# Patient Record
Sex: FEMALE | Race: WHITE | NOT HISPANIC OR LATINO | URBAN - METROPOLITAN AREA
[De-identification: names, ages, dates, MRNs, and addresses within clinical notes are randomized per-mention and may not be internally consistent; named-entity substitution may affect disease eponyms.]

---

## 2017-09-02 ENCOUNTER — OUTPATIENT (OUTPATIENT)
Dept: OUTPATIENT SERVICES | Facility: HOSPITAL | Age: 47
LOS: 1 days | Discharge: HOME | End: 2017-09-02

## 2017-09-02 DIAGNOSIS — Z12.31 ENCOUNTER FOR SCREENING MAMMOGRAM FOR MALIGNANT NEOPLASM OF BREAST: ICD-10-CM

## 2017-10-06 ENCOUNTER — OUTPATIENT (OUTPATIENT)
Dept: OUTPATIENT SERVICES | Facility: HOSPITAL | Age: 47
LOS: 1 days | Discharge: HOME | End: 2017-10-06

## 2017-10-06 DIAGNOSIS — R92.8 OTHER ABNORMAL AND INCONCLUSIVE FINDINGS ON DIAGNOSTIC IMAGING OF BREAST: ICD-10-CM

## 2019-01-10 ENCOUNTER — OUTPATIENT (OUTPATIENT)
Dept: OUTPATIENT SERVICES | Facility: HOSPITAL | Age: 49
LOS: 1 days | Discharge: HOME | End: 2019-01-10

## 2019-01-10 DIAGNOSIS — Z12.31 ENCOUNTER FOR SCREENING MAMMOGRAM FOR MALIGNANT NEOPLASM OF BREAST: ICD-10-CM

## 2019-02-27 ENCOUNTER — EMERGENCY (EMERGENCY)
Facility: HOSPITAL | Age: 49
LOS: 0 days | Discharge: HOME | End: 2019-02-27
Attending: EMERGENCY MEDICINE | Admitting: EMERGENCY MEDICINE

## 2019-02-27 VITALS
SYSTOLIC BLOOD PRESSURE: 142 MMHG | HEART RATE: 72 BPM | TEMPERATURE: 98 F | OXYGEN SATURATION: 99 % | DIASTOLIC BLOOD PRESSURE: 68 MMHG | RESPIRATION RATE: 18 BRPM

## 2019-02-27 VITALS
HEART RATE: 89 BPM | DIASTOLIC BLOOD PRESSURE: 76 MMHG | RESPIRATION RATE: 18 BRPM | OXYGEN SATURATION: 97 % | TEMPERATURE: 98 F | SYSTOLIC BLOOD PRESSURE: 150 MMHG

## 2019-02-27 DIAGNOSIS — R04.0 EPISTAXIS: ICD-10-CM

## 2019-02-27 DIAGNOSIS — L03.213 PERIORBITAL CELLULITIS: ICD-10-CM

## 2019-02-27 DIAGNOSIS — H57.10 OCULAR PAIN, UNSPECIFIED EYE: ICD-10-CM

## 2019-02-27 DIAGNOSIS — E11.9 TYPE 2 DIABETES MELLITUS WITHOUT COMPLICATIONS: ICD-10-CM

## 2019-02-27 RX ORDER — AZTREONAM 2 G
1 VIAL (EA) INJECTION
Qty: 20 | Refills: 0 | OUTPATIENT
Start: 2019-02-27 | End: 2019-03-08

## 2019-02-27 RX ADMIN — Medication 1 TABLET(S): at 22:26

## 2019-02-27 NOTE — ED ADULT NURSE NOTE - OBJECTIVE STATEMENT
pt is A&Ox3, ambulatory, able to make needs known and presents with C/O left eye,shoulder and chest pain since 3pm. Pt also reports HTN with intermittent nose bleed over the past week.

## 2019-02-27 NOTE — ED PROVIDER NOTE - NSFOLLOWUPCLINICS_GEN_ALL_ED_FT
[facility]  Ophthalmolgy  [address], [address]  [place] [address]  Phone: [phone]  Fax:     [facility]  ENT  [address], [address]  [place] [address]  Phone: [phone]  Fax:   Follow Up Time:

## 2019-02-27 NOTE — ED PROVIDER NOTE - PROGRESS NOTE DETAILS
patient has nml vision. no pain on EOM. give abx for preseptal. encourage return for worsening symptoms.

## 2019-02-27 NOTE — ED PROVIDER NOTE - CARE PLAN
Principal Discharge DX:	Preseptal cellulitis of left eye Principal Discharge DX:	Preseptal cellulitis of left eye  Secondary Diagnosis:	Epistaxis

## 2019-02-27 NOTE — ED PROVIDER NOTE - ATTENDING CONTRIBUTION TO CARE
49 yo F, history of well controlled DM II, here for redness to lateral L nose and periorbital area, mild swelling. Sx noted this afternoon. No fever, chills, change in vision, no HA or pain with EOM, no recent exposure or trauma.    VS normal, on exam has mild swelling to periorbital area, mild tenderness over same area and lateral nose. Full, painless EOM, no pain with palpation over globe, no tenderness to temple. Mild nasal turbinate edema L>R.    Sx likely preseptal cellulitis. No signs of orbital cellulitis, globe injury, glaucoma,    Will start abx. Discussesd with patient and son the possibility of failure of outpatient meds and need for close follow up and return precautions. Also explicitly discussed dangers of extension of infection to septal space, sinuses and meningitis and its consequences.

## 2019-02-27 NOTE — ED PROVIDER NOTE - NSFOLLOWUPINSTRUCTIONS_ED_ALL_ED_FT
Periorbital Cellulitis in Adults    WHAT YOU NEED TO KNOW:    Periorbital cellulitis is an infection of the skin and tissues in the front of your eye. The infection can quickly cause vision problems. It can spread to your brain and cause meningitis. Periorbital cellulitis must be treated immediately to prevent serious complications.Cellulitis of the Eye         DISCHARGE INSTRUCTIONS:    Return to the emergency department if:     You lose vision in your infected eye.    You have vision problems, such as double vision.    You have difficulty moving your eyeball.    You cannot close your eye due to swelling.    You have a fever.    You develop a headache and are vomiting.    You have a stiff neck.    Contact your healthcare provider if:   Your symptoms do not get better within 24 hours of treatment.  Your symptoms return.      You have questions or concerns about your condition or care.    Medicines:     Antibiotics are used to treat a bacterial infection.    Take your medicine as directed. Contact your healthcare provider if you think your medicine is not helping or if you have side effects. Tell him or her if you are allergic to any medicine. Keep a list of the medicines, vitamins, and herbs you take. Include the amounts, and when and why you take them. Bring the list or the pill bottles to follow-up visits. Carry your medicine list with you in case of an emergency.    Follow up with your healthcare provider as directed: You may need to be referred to other healthcare providers. You may also need more treatment. Write down your questions so you remember to ask them during your visits.    Keep your eyes clean: Clean your eyes with warm water daily and as needed. Wash your hands with soap and water before and after you clean your eyes.    Protect your eyes: Do not scratch or rub your eyes.    Epistaxis (nosebleed)    Nosebleeds are common and can be caused by many conditions, such as injury, infections, dry mucous membranes or dry climate, medicines, nose picking, and home heating and cooling systems. Try controlling your nosebleed by pinching your nose continuously for at least 10 minutes. Avoid lying down while you are having a nosebleed. Sit up and lean forward. Avoid blowing or sniffing your nose for a number of hours after having a nosebleed. Resume your normal activities as you are able, but avoid straining, lifting, or bending at the waist for several days. Maintain humidity in your home by using less air conditioning or by using a humidifier.     If your nose was packed by your health care provider, try to maintain the pack inside of your nose until your health care provider removes it. If a balloon catheter was used to pack your nose, do not cut or remove it unless your health care provider has instructed you to do that.     Aspirin and blood thinners make bleeding more likely. If you are prescribed these medicines and you suffer from nosebleeds, ask your health care provider if you should stop taking the medicines or adjust the dose. Do not stop medicines unless directed by your health care provider     SEEK IMMEDIATE MEDICAL CARE IF YOU HAVE THE FOLLOWING SYMPTOMS: nosebleed lasting longer than 20 minutes, unusual bleeding from or bruising on other parts of your body, dizziness or lightheadedness, nosebleed occurring after a head injury, or fever.

## 2019-02-27 NOTE — ED PROVIDER NOTE - OBJECTIVE STATEMENT
48 female hx of DM present left eye pain for few hours. pain is surrounding her left eye with mild swelling/ denies injury and trauma. denies vision changes and eye redness and excessive tearing. denies fever/chill/recent illness/rhinorrhea/coughing. patient also reported off/on left nostril bleeding. no active bleeding today.

## 2019-02-27 NOTE — ED PROVIDER NOTE - PHYSICAL EXAMINATION
CONSTITUTIONAL: Well-appearing; well-nourished; in no apparent distress.   EYES: PERRL; EOM intact. VA 20/20. no injection. + mild swelling/tenderness noted around to left eye. left eye with normal EOM without pain.   ENT: + dried blood to left nostril normal nose; no rhinorrhea; normal pharynx with no tonsillar hypertrophy. TM b/l nml.    CARDIOVASCULAR: Normal S1, S2; no murmurs, rubs, or gallops.   RESPIRATORY: Normal chest excursion with respiration; breath sounds clear and equal bilaterally; no wheezes, rhonchi, or rales.  SKIN: no significant erythema/warmness around to left eye.    NEURO/PSYCH: A & O x 4;

## 2019-02-27 NOTE — ED PROVIDER NOTE - CLINICAL SUMMARY MEDICAL DECISION MAKING FREE TEXT BOX
Sx likely preseptal cellulitis. No signs of orbital cellulitis, globe injury, glaucoma,    Will start abx. Discussesd with patient and son the possibility of failure of outpatient meds and need for close follow up and return precautions. Also explicitly discussed dangers of extension of infection to septal space, sinuses and meningitis and its consequences.

## 2019-02-27 NOTE — ED PROVIDER NOTE - NS ED ROS FT
Constitutional: no fever, chills, no recent weight loss, change in appetite or malaise  Eyes: see HPI. no redness/discharge/pain/vision changes  ENT: no rhinorrhea/ear pain/sore throat  Skin: No skin rash.  Endocrine: + diabetes.

## 2019-03-09 ENCOUNTER — OUTPATIENT (OUTPATIENT)
Dept: OUTPATIENT SERVICES | Facility: HOSPITAL | Age: 49
LOS: 1 days | Discharge: HOME | End: 2019-03-09

## 2019-03-09 PROBLEM — E11.9 TYPE 2 DIABETES MELLITUS WITHOUT COMPLICATIONS: Chronic | Status: ACTIVE | Noted: 2019-02-28

## 2019-03-11 DIAGNOSIS — Z13.820 ENCOUNTER FOR SCREENING FOR OSTEOPOROSIS: ICD-10-CM

## 2019-03-11 DIAGNOSIS — N95.9 UNSPECIFIED MENOPAUSAL AND PERIMENOPAUSAL DISORDER: ICD-10-CM

## 2020-09-26 ENCOUNTER — OUTPATIENT (OUTPATIENT)
Dept: OUTPATIENT SERVICES | Facility: HOSPITAL | Age: 50
LOS: 1 days | Discharge: HOME | End: 2020-09-26
Payer: MEDICAID

## 2020-09-26 DIAGNOSIS — Z12.31 ENCOUNTER FOR SCREENING MAMMOGRAM FOR MALIGNANT NEOPLASM OF BREAST: ICD-10-CM

## 2020-09-26 PROCEDURE — 77067 SCR MAMMO BI INCL CAD: CPT | Mod: 26

## 2020-09-26 PROCEDURE — 77063 BREAST TOMOSYNTHESIS BI: CPT | Mod: 26

## 2020-10-08 ENCOUNTER — OUTPATIENT (OUTPATIENT)
Dept: OUTPATIENT SERVICES | Facility: HOSPITAL | Age: 50
LOS: 1 days | Discharge: HOME | End: 2020-10-08
Payer: MEDICAID

## 2020-10-08 DIAGNOSIS — R92.8 OTHER ABNORMAL AND INCONCLUSIVE FINDINGS ON DIAGNOSTIC IMAGING OF BREAST: ICD-10-CM

## 2020-10-08 PROCEDURE — 77061 BREAST TOMOSYNTHESIS UNI: CPT | Mod: 26

## 2020-10-08 PROCEDURE — 77065 DX MAMMO INCL CAD UNI: CPT | Mod: 26,LT
